# Patient Record
Sex: FEMALE
[De-identification: names, ages, dates, MRNs, and addresses within clinical notes are randomized per-mention and may not be internally consistent; named-entity substitution may affect disease eponyms.]

---

## 2020-02-25 ENCOUNTER — NURSE TRIAGE (OUTPATIENT)
Dept: OTHER | Facility: CLINIC | Age: 50
End: 2020-02-25

## 2020-02-25 NOTE — TELEPHONE ENCOUNTER
Reason for Disposition   SEVERE coughing spells (e.g., whooping sound after coughing, vomiting after coughing)    Protocols used: COUGH-ADULT-OH    Pt concerned she has a severe cough. She states she has been sick since Friday. She had low grade fever Friday through half of Sunday. Denies ear pain, sore throat. She describes her cough as \"severe\" and \"deep\". She coughs up \"phlegm\" at times. Denies respiratory or cardiac history. Caller reports symptoms as documented above. Caller informed of disposition. Pt assisted with facilities covered under insurance via [address] [place] [id number] yetu web site. Care advice as documented.

## 2020-03-08 ENCOUNTER — NURSE TRIAGE (OUTPATIENT)
Dept: OTHER | Facility: CLINIC | Age: 50
End: 2020-03-08

## 2025-07-31 ENCOUNTER — APPOINTMENT (OUTPATIENT)
Facility: CLINIC | Age: 55
End: 2025-07-31
Payer: COMMERCIAL

## 2025-07-31 VITALS
SYSTOLIC BLOOD PRESSURE: 131 MMHG | TEMPERATURE: 98.1 F | WEIGHT: 160 LBS | HEART RATE: 92 BPM | BODY MASS INDEX: 31.41 KG/M2 | DIASTOLIC BLOOD PRESSURE: 83 MMHG | HEIGHT: 60 IN

## 2025-07-31 DIAGNOSIS — Z79.899 LONG-TERM USE OF HYDROXYCHLOROQUINE: ICD-10-CM

## 2025-07-31 DIAGNOSIS — M06.9 RHEUMATOID ARTHRITIS INVOLVING MULTIPLE SITES, UNSPECIFIED WHETHER RHEUMATOID FACTOR PRESENT (MULTI): Primary | ICD-10-CM

## 2025-07-31 DIAGNOSIS — Z79.899 HIGH RISK MEDICATION USE: ICD-10-CM

## 2025-07-31 PROCEDURE — 99204 OFFICE O/P NEW MOD 45 MIN: CPT | Performed by: STUDENT IN AN ORGANIZED HEALTH CARE EDUCATION/TRAINING PROGRAM

## 2025-07-31 PROCEDURE — 1036F TOBACCO NON-USER: CPT | Performed by: STUDENT IN AN ORGANIZED HEALTH CARE EDUCATION/TRAINING PROGRAM

## 2025-07-31 PROCEDURE — 3008F BODY MASS INDEX DOCD: CPT | Performed by: STUDENT IN AN ORGANIZED HEALTH CARE EDUCATION/TRAINING PROGRAM

## 2025-07-31 RX ORDER — FLUTICASONE PROPIONATE 50 MCG
1 SPRAY, SUSPENSION (ML) NASAL DAILY
COMMUNITY

## 2025-07-31 RX ORDER — GLUCOSAM/CHONDRO/HERB 149/HYAL 750-100 MG
TABLET ORAL
COMMUNITY

## 2025-07-31 RX ORDER — MULTIVIT-MIN/IRON/MEFOLATE/K1 45-800-120
CAPSULE ORAL
COMMUNITY

## 2025-07-31 RX ORDER — VALACYCLOVIR HYDROCHLORIDE 1 G/1
TABLET, FILM COATED ORAL
COMMUNITY
Start: 2025-07-14

## 2025-07-31 RX ORDER — VIT C/E/ZN/COPPR/LUTEIN/ZEAXAN 250MG-90MG
CAPSULE ORAL
COMMUNITY

## 2025-07-31 RX ORDER — HYDROXYCHLOROQUINE SULFATE 200 MG/1
TABLET ORAL EVERY 24 HOURS
COMMUNITY
Start: 2024-10-30 | End: 2025-07-31 | Stop reason: WASHOUT

## 2025-07-31 RX ORDER — TRIAMTERENE AND HYDROCHLOROTHIAZIDE 37.5; 25 MG/1; MG/1
1 CAPSULE ORAL
COMMUNITY
Start: 2025-07-18

## 2025-07-31 RX ORDER — HYDROXYCHLOROQUINE SULFATE 200 MG/1
300 TABLET, FILM COATED ORAL 2 TIMES DAILY
Qty: 45 TABLET | Refills: 2 | Status: SHIPPED | OUTPATIENT
Start: 2025-07-31

## 2025-07-31 RX ORDER — NICOTINE POLACRILEX 2 MG
GUM BUCCAL
COMMUNITY

## 2025-07-31 NOTE — PROGRESS NOTES
Rheumatology New Outpatient  Note    Subjective   Yumi Yeung is a 55 y.o. female presenting today for Joint Pain.    History of Presenting Problem:   Recall:   Former patient of Dr. Simms. Seropositive (RF+) RA. Diagnosed in ~2022. She had pain and swelling, stiffness and joint pain. 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 She had blood work and was dx with RA. She has been on HCQ (300 mg daily) since then and Meloxicam as needed. She thinks HCQ helps. She rarely needs to take Meloxicam.     Hand x-rays 1/2024: no significant abnormalities noted    Interval history:  She is doing well on HCQ no pain in hands or feet. No other joints involved.     Past Medical History: Medical History[1]    Allergies: Allergies[2]    Medications: Current Medications[3]    Review of Systems:     All 10 review of systems have been reviewed and are negative for complaint except as noted in the HPI    Objective   Physical Examination:  There were no vitals filed for this visit.  G[employer] Smart[employer] can only be used for patients less than 20 years old.  Ht Readings from Last 1 Encounters:   12/10/22 1.524 m (5')     Wt Readings from Last 1 Encounters:   07/17/23 71.7 kg (158 lb)       General - NAD, sitting up in chair, well-groomed, pleasant, AAOx3  Head: Normocephalic, atraumatic  Eyes - PERRLA, EOMI. No conjunctiva injection.   Skin - No rashes or ulcers. Skin warm and dry. No erythema on bilateral cheeks.  Extremities - No edema, cyanosis ,or clubbing  Neurological - Alert and oriented x 3,  grossly intact. No focal deficit.  Musculoskeletal -  Shoulders: Full ROM, without pain, no swelling, warmth or tenderness.  Elbows: Full ROM, without pain, no swelling, warmth or tenderness.  Wrists: Full ROM, without pain, no swelling, warmth or tenderness.  MCP: No swelling, warmth or tenderness. Metacarpal squeeze negative  PIP: No swelling, warmth or tenderness.  DIP: No swelling, warmth or tenderness.  Hands : 5/5.     Sacroiliac joints: No local tenderness. JOLYNN negative.   Hips: Full ROM.  No malalignment.  Knees:  Full ROM, without pain, no swelling, warmth or point tenderness.   Ankles: Full ROM, without pain, swelling, warmth or tenderness.  Toes:  Metatarsal squeeze negative  Cervical spine: No tenderness or limitation of movement  Lumbar spine: No tenderness or limitation of movement          Assessment/Plan   Yumi Yeung is a 55 y.o. female with PMHx of RA who is presenting today as a NP for joint pain      ##Rheumatoid arthritis:  -Manifested by inflammatory arthritis in hands. Reported RF+  -Currently stable  -Reviewed outside labs 7/2025: CBC normal, CP with AST normal, ALT 35  -Most recent hand xray's 1/2024: no changes  -Continue  mg daily  -Pain medications: Meloxicam as needed  -Encouraged to increase exercise and physical activity  -Advised to improve cardiovascular risk factors (smoking, BP, diabetes, lipids..etc)        ##HCQ long term use:  -Educated the patient about the potential side effects of using antimalarials. Serious side effects are extremely rare. However, the fear of vision-threatening toxic retinopathy remains a major concern, this can be avoided when using the appropriate doses along with routine ocular monitoring.  -Current dose is 300 mg daily (<5mg/kg/day)  -Last eye exam: 4/2025    The assessment and plan, risk and benefits were discussed with the patient. All of the patients questions were answered and patient agrees to the plan.      Rosaura Heart MD  Clinical   Department of Rheumatology   [facility]           [1]   Past Medical History:  Diagnosis Date    Personal history of other benign neoplasm     History of other benign neoplasm    Personal history of urinary calculi     History of kidney stones   [2] Not on File  [3] No current outpatient medications on file.

## 2025-08-01 LAB
ANA SER QL IF: NEGATIVE
CCP IGG SERPL-ACNC: <16 UNITS
CRP SERPL-MCNC: 5.5 MG/L
ERYTHROCYTE [SEDIMENTATION RATE] IN BLOOD BY WESTERGREN METHOD: 6 MM/H
RHEUMATOID FACT SERPL-ACNC: <10 IU/ML

## 2025-10-28 ENCOUNTER — APPOINTMENT (OUTPATIENT)
Facility: CLINIC | Age: 55
End: 2025-10-28
Payer: COMMERCIAL